# Patient Record
Sex: MALE | Race: WHITE | NOT HISPANIC OR LATINO | URBAN - METROPOLITAN AREA
[De-identification: names, ages, dates, MRNs, and addresses within clinical notes are randomized per-mention and may not be internally consistent; named-entity substitution may affect disease eponyms.]

---

## 2017-10-30 ENCOUNTER — HOSPITAL ENCOUNTER (EMERGENCY)
Facility: CLINIC | Age: 32
Discharge: HOME | End: 2017-10-30
Attending: EMERGENCY MEDICINE | Admitting: EMERGENCY MEDICINE
Payer: COMMERCIAL

## 2017-10-30 ENCOUNTER — APPOINTMENT (OUTPATIENT)
Dept: GENERAL RADIOLOGY | Facility: CLINIC | Age: 32
End: 2017-10-30
Attending: EMERGENCY MEDICINE
Payer: COMMERCIAL

## 2017-10-30 VITALS
RESPIRATION RATE: 13 BRPM | OXYGEN SATURATION: 99 % | TEMPERATURE: 98.1 F | WEIGHT: 222.4 LBS | SYSTOLIC BLOOD PRESSURE: 133 MMHG | DIASTOLIC BLOOD PRESSURE: 85 MMHG

## 2017-10-30 DIAGNOSIS — R10.13 ABDOMINAL PAIN, EPIGASTRIC: ICD-10-CM

## 2017-10-30 LAB
ALBUMIN SERPL-MCNC: 3.8 G/DL (ref 3.4–5)
ALP SERPL-CCNC: 65 U/L (ref 40–150)
ALT SERPL W P-5'-P-CCNC: 25 U/L (ref 0–70)
ANION GAP SERPL CALCULATED.3IONS-SCNC: 5 MMOL/L (ref 3–14)
AST SERPL W P-5'-P-CCNC: 17 U/L (ref 0–45)
BASOPHILS # BLD AUTO: 0 10E9/L (ref 0–0.2)
BASOPHILS NFR BLD AUTO: 0.3 %
BILIRUB SERPL-MCNC: 0.4 MG/DL (ref 0.2–1.3)
BUN SERPL-MCNC: 19 MG/DL (ref 7–30)
CALCIUM SERPL-MCNC: 8.3 MG/DL (ref 8.5–10.1)
CHLORIDE SERPL-SCNC: 107 MMOL/L (ref 94–109)
CO2 SERPL-SCNC: 28 MMOL/L (ref 20–32)
CREAT SERPL-MCNC: 0.98 MG/DL (ref 0.66–1.25)
DIFFERENTIAL METHOD BLD: NORMAL
EOSINOPHIL # BLD AUTO: 0.4 10E9/L (ref 0–0.7)
EOSINOPHIL NFR BLD AUTO: 5.3 %
ERYTHROCYTE [DISTWIDTH] IN BLOOD BY AUTOMATED COUNT: 12.5 % (ref 10–15)
GFR SERPL CREATININE-BSD FRML MDRD: 88 ML/MIN/1.7M2
GLUCOSE SERPL-MCNC: 92 MG/DL (ref 70–99)
HCT VFR BLD AUTO: 42 % (ref 40–53)
HGB BLD-MCNC: 14.3 G/DL (ref 13.3–17.7)
IMM GRANULOCYTES # BLD: 0 10E9/L (ref 0–0.4)
IMM GRANULOCYTES NFR BLD: 0.3 %
INTERPRETATION ECG - MUSE: NORMAL
LIPASE SERPL-CCNC: 174 U/L (ref 73–393)
LYMPHOCYTES # BLD AUTO: 2.1 10E9/L (ref 0.8–5.3)
LYMPHOCYTES NFR BLD AUTO: 31 %
MCH RBC QN AUTO: 31 PG (ref 26.5–33)
MCHC RBC AUTO-ENTMCNC: 34 G/DL (ref 31.5–36.5)
MCV RBC AUTO: 91 FL (ref 78–100)
MONOCYTES # BLD AUTO: 0.5 10E9/L (ref 0–1.3)
MONOCYTES NFR BLD AUTO: 6.7 %
NEUTROPHILS # BLD AUTO: 3.9 10E9/L (ref 1.6–8.3)
NEUTROPHILS NFR BLD AUTO: 56.4 %
NRBC # BLD AUTO: 0 10*3/UL
NRBC BLD AUTO-RTO: 0 /100
PLATELET # BLD AUTO: 209 10E9/L (ref 150–450)
POTASSIUM SERPL-SCNC: 3.9 MMOL/L (ref 3.4–5.3)
PROT SERPL-MCNC: 6.7 G/DL (ref 6.8–8.8)
RBC # BLD AUTO: 4.61 10E12/L (ref 4.4–5.9)
SODIUM SERPL-SCNC: 140 MMOL/L (ref 133–144)
TROPONIN I SERPL-MCNC: <0.015 UG/L (ref 0–0.04)
WBC # BLD AUTO: 6.8 10E9/L (ref 4–11)

## 2017-10-30 PROCEDURE — 85025 COMPLETE CBC W/AUTO DIFF WBC: CPT | Performed by: EMERGENCY MEDICINE

## 2017-10-30 PROCEDURE — 84484 ASSAY OF TROPONIN QUANT: CPT | Performed by: EMERGENCY MEDICINE

## 2017-10-30 PROCEDURE — 25000132 ZZH RX MED GY IP 250 OP 250 PS 637: Performed by: EMERGENCY MEDICINE

## 2017-10-30 PROCEDURE — 80053 COMPREHEN METABOLIC PANEL: CPT | Performed by: EMERGENCY MEDICINE

## 2017-10-30 PROCEDURE — 83690 ASSAY OF LIPASE: CPT | Performed by: EMERGENCY MEDICINE

## 2017-10-30 PROCEDURE — 71020 XR CHEST 2 VW: CPT

## 2017-10-30 PROCEDURE — 25000125 ZZHC RX 250: Performed by: EMERGENCY MEDICINE

## 2017-10-30 PROCEDURE — 93005 ELECTROCARDIOGRAM TRACING: CPT

## 2017-10-30 PROCEDURE — 99285 EMERGENCY DEPT VISIT HI MDM: CPT | Mod: 25

## 2017-10-30 RX ORDER — DIPHENHYDRAMINE HYDROCHLORIDE AND LIDOCAINE HYDROCHLORIDE AND ALUMINUM HYDROXIDE AND MAGNESIUM HYDRO
5-10 KIT EVERY 6 HOURS PRN
Qty: 237 ML | Refills: 1 | Status: SHIPPED | OUTPATIENT
Start: 2017-10-30

## 2017-10-30 RX ADMIN — LIDOCAINE HYDROCHLORIDE 30 ML: 20 SOLUTION ORAL; TOPICAL at 19:00

## 2017-10-30 ASSESSMENT — ENCOUNTER SYMPTOMS
SHORTNESS OF BREATH: 0
COUGH: 0
DIARRHEA: 0
CONSTIPATION: 0
ANAL BLEEDING: 0
BLOOD IN STOOL: 0
DIAPHORESIS: 0
FEVER: 0
NAUSEA: 0
VOMITING: 0

## 2017-10-30 NOTE — ED AVS SNAPSHOT
Emergency Department    [address] [facility] [address]    Phone:  [phone]    Fax:  [phone]                                       Cesario Boykin   MRN: [id number]    Department:   Emergency Department   Date of Visit:  10/30/2017           Patient Information     Date Of Birth          1985        Your diagnoses for this visit were:     Abdominal pain, epigastric        You were seen by Tomás Hernandez MD.      Follow-up Information     Follow up with  Emergency Department.    Specialty:  EMERGENCY MEDICINE    Why:  As needed    Contact information:    [address] [facility] [address] [phone]        Follow up with Demar Marsh MD In 1 day.    Specialty:  Family Practice    Contact information:    [facility]  [address]  [facility] [address] [phone]          Discharge Instructions       Take the below medications as prescribed.  Please do not miss any doses.    New Prescriptions    MAGIC MOUTHWASH SUSPENSION (DIPHENHYDRAMINE, LIDOCAINE, ALUMINUM-MAGNESIUM & SIMETHICONE)    Swish and swallow 5-10 mLs in mouth every 6 hours as needed for mouth sores    OMEPRAZOLE (PRILOSEC) 20 MG CR CAPSULE    Take 2 capsules (40 mg) by mouth 2 times daily for 14 days     1. Avoid NSAIDs.  2. Discuss follow-up with Gastroenterology with your primary doctor tomorrow.  3. Please return to the ED as needed for new or worsening symptoms such as vomiting blood, bloody or black bowel movements, fainting, new or worsening chest pain, any other concerning symptoms.            Discharge References/Attachments     GASTRITIS (ADULT) (ENGLISH)      24 Hour Appointment Hotline       To make an appointment at any [facility] clinic, call [phone] ([phone]). If you don't have a family doctor or clinic, we will help you find one. [facility] clinics are conveniently located to serve the needs of you and your family.             Review of your  medicines      START taking        Dose / Directions Last dose taken    FIRST-MOUTHWASH BLM MT Susp compounding kit   Dose:  5-10 mL   Quantity:  237 mL        Swish and swallow 5-10 mLs in mouth every 6 hours as needed for mouth sores   Refills:  1        omeprazole 20 MG CR capsule   Commonly known as:  priLOSEC   Dose:  40 mg   Quantity:  56 capsule        Take 2 capsules (40 mg) by mouth 2 times daily for 14 days   Refills:  0                Prescriptions were sent or printed at these locations (2 Prescriptions)                   Other Prescriptions                Printed at Department/Unit printer (2 of 2)         omeprazole (PRILOSEC) 20 MG CR capsule               magic mouthwash suspension (diphenhydramine, lidocaine, aluminum-magnesium & simethicone)                Procedures and tests performed during your visit     CBC with platelets differential    Comprehensive metabolic panel    EKG 12-lead, tracing only    Lipase    Troponin I    XR Chest 2 Views      Orders Needing Specimen Collection     None      Pending Results     No orders found from 10/28/2017 to 10/31/2017.            Pending Culture Results     No orders found from 10/28/2017 to 10/31/2017.            Pending Results Instructions     If you had any lab results that were not finalized at the time of your Discharge, you can call the ED Lab Result RN at [phone]. You will be contacted by this team for any positive Lab results or changes in treatment. The nurses are available 7 days a week from 10A to 6:30P.  You can leave a message 24 hours per day and they will return your call.        Test Results From Your Hospital Stay        10/30/2017  7:24 PM      Component Results     Component Value Ref Range & Units Status    WBC 6.8 4.0 - 11.0 10e9/L Final    RBC Count 4.61 4.4 - 5.9 10e12/L Final    Hemoglobin 14.3 13.3 - 17.7 g/dL Final    Hematocrit 42.0 40.0 - 53.0 % Final    MCV 91 78 - 100 fl Final    MCH 31.0 26.5 - 33.0 pg Final    MCHC  34.0 31.5 - 36.5 g/dL Final    RDW 12.5 10.0 - 15.0 % Final    Platelet Count 209 150 - 450 10e9/L Final    Diff Method Automated Method  Final    % Neutrophils 56.4 % Final    % Lymphocytes 31.0 % Final    % Monocytes 6.7 % Final    % Eosinophils 5.3 % Final    % Basophils 0.3 % Final    % Immature Granulocytes 0.3 % Final    Nucleated RBCs 0 0 /100 Final    Absolute Neutrophil 3.9 1.6 - 8.3 10e9/L Final    Absolute Lymphocytes 2.1 0.8 - 5.3 10e9/L Final    Absolute Monocytes 0.5 0.0 - 1.3 10e9/L Final    Absolute Eosinophils 0.4 0.0 - 0.7 10e9/L Final    Absolute Basophils 0.0 0.0 - 0.2 10e9/L Final    Abs Immature Granulocytes 0.0 0 - 0.4 10e9/L Final    Absolute Nucleated RBC 0.0  Final         10/30/2017  7:45 PM      Component Results     Component Value Ref Range & Units Status    Sodium 140 133 - 144 mmol/L Final    Potassium 3.9 3.4 - 5.3 mmol/L Final    Chloride 107 94 - 109 mmol/L Final    Carbon Dioxide 28 20 - 32 mmol/L Final    Anion Gap 5 3 - 14 mmol/L Final    Glucose 92 70 - 99 mg/dL Final    Urea Nitrogen 19 7 - 30 mg/dL Final    Creatinine 0.98 0.66 - 1.25 mg/dL Final    GFR Estimate 88 >60 mL/min/1.7m2 Final    Non  GFR Calc    GFR Estimate If Black >90 >60 mL/min/1.7m2 Final    African American GFR Calc    Calcium 8.3 (L) 8.5 - 10.1 mg/dL Final    Bilirubin Total 0.4 0.2 - 1.3 mg/dL Final    Albumin 3.8 3.4 - 5.0 g/dL Final    Protein Total 6.7 (L) 6.8 - 8.8 g/dL Final    Alkaline Phosphatase 65 40 - 150 U/L Final    ALT 25 0 - 70 U/L Final    AST 17 0 - 45 U/L Final         10/30/2017  7:41 PM      Component Results     Component Value Ref Range & Units Status    Lipase 174 73 - 393 U/L Final         10/30/2017  7:45 PM      Component Results     Component Value Ref Range & Units Status    Troponin I ES <0.015 0.000 - 0.045 ug/L Final    The 99th percentile for upper reference range is 0.045 ug/L.  Troponin values   in the range of 0.045 - 0.120 ug/L may be associated with risks  of adverse   clinical events.           10/30/2017  7:49 PM      Narrative     XR CHEST 2 VW 10/30/2017 7:21 PM    COMPARISON: None.    HISTORY: Chest pain.        Impression     IMPRESSION: Cardiac silhouette and pulmonary vasculature are within  normal limits. No focal airspace disease, pleural effusion or  pneumothorax.    SUZANNA SAMUELS MD                Clinical Quality Measure: Blood Pressure Screening     Your blood pressure was checked while you were in the emergency department today. The last reading we obtained was  BP: 128/85 . Please read the guidelines below about what these numbers mean and what you should do about them.  If your systolic blood pressure (the top number) is less than 120 and your diastolic blood pressure (the bottom number) is less than 80, then your blood pressure is normal. There is nothing more that you need to do about it.  If your systolic blood pressure (the top number) is 120-139 or your diastolic blood pressure (the bottom number) is 80-89, your blood pressure may be higher than it should be. You should have your blood pressure rechecked within a year by a primary care provider.  If your systolic blood pressure (the top number) is 140 or greater or your diastolic blood pressure (the bottom number) is 90 or greater, you may have high blood pressure. High blood pressure is treatable, but if left untreated over time it can put you at risk for heart attack, stroke, or kidney failure. You should have your blood pressure rechecked by a primary care provider within the next 4 weeks.  If your provider in the emergency department today gave you specific instructions to follow-up with your doctor or provider even sooner than that, you should follow that instruction and not wait for up to 4 weeks for your follow-up visit.        Thank you for choosing [facility]       Thank you for choosing [facility] for your care. Our goal is always to provide you with excellent care. Hearing back from our  "patients is one way we can continue to improve our services. Please take a few minutes to complete the written survey that you may receive in the mail after you visit with us. Thank you!        [employer] Information     [employer] lets you send messages to your doctor, view your test results, renew your prescriptions, schedule appointments and more. To sign up, go to [email or web address] . Click on \"Log in\" on the left side of the screen, which will take you to the Welcome page. Then click on \"Sign up Now\" on the right side of the page.     You will be asked to enter the access code listed below, as well as some personal information. Please follow the directions to create your username and password.     Your access code is: [email or web address]  Expires: 2018  9:00 PM     Your access code will  in 90 days. If you need help or a new code, please call your [facility] clinic or [phone].        Care EveryWhere ID     This is your Care EveryWhere ID. This could be used by other organizations to access your [facility] medical records  [id number]        Equal Access to Services     GENA LIMA : Hadmolly Zepeda, carlton vigil, katelynn perera, eric maria . So [facility] [phone].    ATENCIÓN: Si habla español, tiene a shell disposición servicios gratuitos de asistencia lingüística. Lisette al [phone].    We comply with applicable federal civil rights laws and [place] laws. We do not discriminate on the basis of race, color, national origin, age, disability, sex, sexual orientation, or gender identity.            After Visit Summary       This is your record. Keep this with you and show to your community pharmacist(s) and doctor(s) at your next visit.                  "

## 2017-10-30 NOTE — ED AVS SNAPSHOT
Emergency Department    [address] [place] [address]    Phone:  [phone]    Fax:  [phone]                                       Cesario Boykin   MRN: [id number]    Department:   Emergency Department   Date of Visit:  10/30/2017           After Visit Summary Signature Page     I have received my discharge instructions, and my questions have been answered. I have discussed any challenges I see with this plan with the nurse or doctor.    ..........................................................................................................................................  Patient/Patient Representative Signature      ..........................................................................................................................................  Patient Representative Print Name and Relationship to Patient    ..................................................               ................................................  Date                                            Time    ..........................................................................................................................................  Reviewed by Signature/Title    ...................................................              ..............................................  Date                                                            Time

## 2017-10-30 NOTE — ED PROVIDER NOTES
History     Chief Complaint:  Chest Pain     HPI   Cesario Boykin is a 32 year old male with history of GERD who presents to the emergency department today for evaluation of chest pain. The patient reports left sided chest pain since around 1900 last night, that started while at rest. This chest pain has been present waxing and waning since onset, and is more of a pressure than pain rating his discomfort a 5/10. The patient reports history of GERD and a stomach ulcer and history of associated chest pain. However, he states he has never had chest pain last as long as it has currently. The pain is not worsened with eating or movement, and did not seem to be alleviated by an antacid. However, he does report using a different machine at the gym yesterday than usual, but he denied any discomfort while at the gym. He denies any accompanying shortness of breath, nausea, diaphoresis. He denies any changes in bowel movents, black/bloody stools, vomiting, urinary symptoms, cough, fever, rashes, or any other symptoms.     Allergies:  No Known Drug Allergies      Medications:    Mobic 15mg PO  Lansoprazole 30mg PO     Past Medical History:    GERD   Diverticulosis of colon  Migraine     Past Surgical History:    History reviewed. No pertinent past surgical history.     Family History:    History reviewed. No pertinent family history.      Social History:  The patient was accompanied to the ED by himself.  Smoking Status: Former smoker  Smokeless Tobacco: No  Alcohol Use: Yes   Marital Status:  Single     Review of Systems   Constitutional: Negative for diaphoresis and fever.   Respiratory: Negative for cough and shortness of breath.    Cardiovascular: Positive for chest pain.   Gastrointestinal: Negative for anal bleeding, blood in stool, constipation, diarrhea, nausea and vomiting.   Skin: Negative for rash.   All other systems reviewed and are negative.    Physical Exam     Patient Vitals for the past 24 hrs:   BP Temp  Temp src Heart Rate Resp SpO2 Weight   10/30/17 2106 133/85 - - 56 - 99 % -   10/30/17 2030 128/85 - - 57 13 97 % -   10/30/17 2000 137/85 - - 55 15 96 % -   10/30/17 1941 134/81 - - - - 97 % -   10/30/17 1816 152/80 98.1  F (36.7  C) Temporal 58 18 100 % 100.9 kg (222 lb 6.4 oz)      Physical Exam  Constitutional: Well developed, nontox appearance  Head: Atraumatic.   Mouth/Throat: Oropharynx is clear and moist.   Neck:  no stridor  Eyes: no scleral icterus  Cardiovascular: RRR, 2+ bilat radial pulses, bilat DP pulses  Pulmonary/Chest: nml resp effort, Clear BS bilat, no chest tenderness  Abdominal: ND, +BS, soft, epigastric tenderness, no rebound or guarding   : no CVA tenderness bilat  Ext: Warm, well perfused, no edema  Neurological: A&O, symmetric facies, moves ext x4  Skin: Skin is warm and dry.   Psychiatric: Behavior is normal. Thought content normal.   Nursing note and vitals reviewed.      Emergency Department Course     ECG:  Indication: Chest pain   Completed at 1823.  Read at 1830.   Normal sinus rhythm with sinus arrhythmia. Possible left atrial enlargement. Borderline ECG.   Rate 68 bpm. OR interval 128. QRS duration 86. QT/QTc 414/440. P-R-T axes 76 50 49.     Imaging:  Radiology findings were communicated with the patient who voiced understanding of the findings.    XR Chest 2 Views:  IMPRESSION: Cardiac silhouette and pulmonary vasculature are within  normal limits. No focal airspace disease, pleural effusion or  pneumothorax.  SUZANNA SAMUELS MD    Laboratory:  Laboratory findings were communicated with the patient who voiced understanding of the findings.    CBC: WBC 6.8, HGB 14.3,   CMP: Calcium 8.3 (L), Protein total 6.7 (L), o/w WNL. (Creatinine 0.98)   Lipase: 174   Troponin (Collected 1900): <0.015     Interventions:  1900 GI Cocktail 30mg PO      Emergency Department Course:  Nursing notes and vitals reviewed.  1829 I performed an exam of the patient as documented above.   EKG  obtained in the ED, see results above.    IV was inserted and blood was drawn for laboratory testing, results above.   The patient was sent for a chest x-ray while in the emergency department, results above.    2048 Patient rechecked and updated on laboratory and imaging results.  I discussed the treatment plan with the patient. They expressed understanding of this plan and consented to discharge. They will be discharged home with instructions for care and follow up. In addition, the patient will return to the emergency department if their symptoms worsen, if new symptoms arise or if there is any concern.  All questions were answered. I personally reviewed the laboratory and imaging results with the Patient and answered all related questions prior to discharge.   Impression & Plan      Medical Decision Making:  Cesario Boykin is a 32 year old male presenting with left lower chest discomfort, non pleuritic.    Differential diagnosis includes GERD, gastritis, ulcer, pneumothorax, pneumonia. Doubt PE given patient is PERC negative. Given epigastric tenderness on exam it would seem most likely the patient is having another episode of gastritis given the symptoms are similar to previous episodes of gastritis/ ulcer although prolonged. Patient given a GI cocktail with mild improvement in symptoms. Labs unremarkable including negative troponin. EKG also unremarkable for acute ischemic findings. Patient likely has a recurrence of his gastritis or ulcer. I changed his prevacid to high dose omeprazole BID with recommendations to follow up with his primary care doctor as scheduled tomorrow with discussion of possible GI referral for EGD. The patient is given the prescriptions as noted below. Counseled on all results, diagnosis, and disposition prior to discharge. He is in understanding and agreement of plan. Patient discharged in stable condition.     Diagnosis:    ICD-10-CM    1. Abdominal pain, epigastric R10.13       Disposition:  Discharged to home with the below prescription.     Discharge Medications:   Details   omeprazole (PRILOSEC) 20 MG CR capsule Take 2 capsules (40 mg) by mouth 2 times daily for 14 days, Disp-56 capsule, R-0, Local Print      magic mouthwash suspension (diphenhydramine, lidocaine, aluminum-magnesium & simethicone) Swish and swallow 5-10 mLs in mouth every 6 hours as needed for mouth sores, Disp-237 mL, R-1, Local Print             Scribe Disclosure:  IJose De Jesus, am serving as a scribe at 6:26 PM on 10/30/2017 to document services personally performed by Tomás Hernandez MD based on my observations and the provider's statements to me.    10/30/2017    EMERGENCY DEPARTMENT       Tomás Hernandez MD  10/31/17 4802

## 2017-10-31 NOTE — DISCHARGE INSTRUCTIONS
Take the below medications as prescribed.  Please do not miss any doses.    New Prescriptions    MAGIC MOUTHWASH SUSPENSION (DIPHENHYDRAMINE, LIDOCAINE, ALUMINUM-MAGNESIUM & SIMETHICONE)    Swish and swallow 5-10 mLs in mouth every 6 hours as needed for mouth sores    OMEPRAZOLE (PRILOSEC) 20 MG CR CAPSULE    Take 2 capsules (40 mg) by mouth 2 times daily for 14 days     1. Avoid NSAIDs.  2. Discuss follow-up with Gastroenterology with your primary doctor tomorrow.  3. Please return to the ED as needed for new or worsening symptoms such as vomiting blood, bloody or black bowel movements, fainting, new or worsening chest pain, any other concerning symptoms.

## 2017-10-31 NOTE — ED NOTES
MD at bedside to reassess pt.  Dayna Pablo RN,.......................................... 10/30/2017   8:50 PM

## 2019-06-19 ENCOUNTER — OFFICE VISIT - HEALTHEAST (OUTPATIENT)
Dept: RHEUMATOLOGY | Facility: CLINIC | Age: 34
End: 2019-06-19

## 2019-06-19 DIAGNOSIS — R20.2 NUMBNESS AND TINGLING OF HAND: ICD-10-CM

## 2019-06-19 DIAGNOSIS — M54.2 CERVICALGIA: ICD-10-CM

## 2019-06-19 DIAGNOSIS — R20.0 NUMBNESS AND TINGLING OF HAND: ICD-10-CM

## 2019-06-19 DIAGNOSIS — M25.50 POLYARTHRALGIA: ICD-10-CM

## 2019-06-19 LAB
ALBUMIN SERPL-MCNC: 4.3 G/DL (ref 3.5–5)
ALT SERPL W P-5'-P-CCNC: 20 U/L (ref 0–45)
BASOPHILS # BLD AUTO: 0 THOU/UL (ref 0–0.2)
BASOPHILS NFR BLD AUTO: 1 % (ref 0–2)
CREAT SERPL-MCNC: 1.07 MG/DL (ref 0.7–1.3)
EOSINOPHIL # BLD AUTO: 0.2 THOU/UL (ref 0–0.4)
EOSINOPHIL NFR BLD AUTO: 3 % (ref 0–6)
ERYTHROCYTE [DISTWIDTH] IN BLOOD BY AUTOMATED COUNT: 11.2 % (ref 11–14.5)
GFR SERPL CREATININE-BSD FRML MDRD: >60 ML/MIN/1.73M2
HCT VFR BLD AUTO: 46.7 % (ref 40–54)
HGB BLD-MCNC: 15.7 G/DL (ref 14–18)
LYMPHOCYTES # BLD AUTO: 2.1 THOU/UL (ref 0.8–4.4)
LYMPHOCYTES NFR BLD AUTO: 32 % (ref 20–40)
MCH RBC QN AUTO: 30.7 PG (ref 27–34)
MCHC RBC AUTO-ENTMCNC: 33.5 G/DL (ref 32–36)
MCV RBC AUTO: 92 FL (ref 80–100)
MONOCYTES # BLD AUTO: 0.6 THOU/UL (ref 0–0.9)
MONOCYTES NFR BLD AUTO: 9 % (ref 2–10)
NEUTROPHILS # BLD AUTO: 3.7 THOU/UL (ref 2–7.7)
NEUTROPHILS NFR BLD AUTO: 55 % (ref 50–70)
PLATELET # BLD AUTO: 198 THOU/UL (ref 140–440)
PMV BLD AUTO: 8.3 FL (ref 7–10)
RBC # BLD AUTO: 5.1 MILL/UL (ref 4.4–6.2)
RHEUMATOID FACT SERPL-ACNC: <15 IU/ML (ref 0–30)
TSH SERPL DL<=0.005 MIU/L-ACNC: 0.94 UIU/ML (ref 0.3–5)
URATE SERPL-MCNC: 4.1 MG/DL (ref 3–8)
WBC: 6.7 THOU/UL (ref 4–11)

## 2019-06-19 ASSESSMENT — MIFFLIN-ST. JEOR: SCORE: 1915.55

## 2019-06-20 LAB
ANA SER QL: 0.5 U
CCP AB SER IA-ACNC: <0.5 U/ML
HCV AB SERPL QL IA: NEGATIVE

## 2019-07-04 ENCOUNTER — COMMUNICATION - HEALTHEAST (OUTPATIENT)
Dept: SCHEDULING | Facility: CLINIC | Age: 34
End: 2019-07-04

## 2021-05-29 NOTE — PROGRESS NOTES
ASSESSMENT AND PLAN:  Cesario Boykin 34 y.o. male is seen here on 06/19/19 for evaluation of bilateral hand pain, numbness tingling, with features suggestive of carpal tunnel syndrome bilaterally, further work-up as noted, including EMG, given the bilaterality further work-up as noted.  His neck pain is long standing, and associated conditions such as disc disease could cause impingement and neuropathic symptoms.  Once again the EMG would help point out to close there.  Further evaluation as noted with C-spine x-rays.  We will get together in the next few weeks for the course to be charted accordingly.    Diagnoses and all orders for this visit:    Polyarthralgia  -     HM1(CBC and Differential)  -     Creatinine  -     ALT (SGPT)  -     Albumin  -     CCP Antibodies  -     Rheumatoid Factor Quant  -     Hepatitis C Antibody (Anti-HCV)  -     Uric Acid  -     Antinuclear Antibody (JOSHUA) Cascade  -     HM1 (CBC with Diff)  -     EMG; Future; Expected date: 06/19/2019  -     TSH    Numbness and tingling of hand  -     XR Cervical Spine 2 - 3 VWS; Future; Expected date: 06/19/2019  -     HM1(CBC and Differential)  -     Creatinine  -     ALT (SGPT)  -     Albumin  -     CCP Antibodies  -     Rheumatoid Factor Quant  -     Hepatitis C Antibody (Anti-HCV)  -     Uric Acid  -     Antinuclear Antibody (JOSHUA) Cascade  -     XR Cervical Spine 2 - 3 VWS  -     HM1 (CBC with Diff)  -     EMG; Future; Expected date: 06/19/2019  -     TSH    Cervicalgia  -     XR Cervical Spine 2 - 3 VWS; Future; Expected date: 06/19/2019  -     XR Cervical Spine 2 - 3 VWS      HISTORY OF PRESENTING ILLNESS:  Cesario Boykin, 34 y.o., male is here for evaluation of multiplicity of symptoms.  He reports bilateral hand pain numbness and tingling, this is gone on for the past 2 years, this is intermittent, the does not seem to be a particular pattern although wonders if he uses his hands more the symptoms get worse, he rates these at a  moderately severe.  In addition he has had a painful neck which has troubled him before he went longer 3 to 4 years.  He is not sure if there is a radicular component, he has noted the pain besides the neck in the interscapular area.  He recalls being seen at a clinic elsewhere where an MRI of the C-spine recall.  He has not had low back pain.  2 weeks ago while on vacation he was doing some had standing and lost balance and has injured his right hand/wrist.  He has noted stiffness in the morning for about an hour.  He is noted fatigue had a rash such as psoriasis himself or the family.  There is no family history of rheumatoid arthritis ankylosing spondylitis lupus.  He is a , non-smoker alcohol every now and again.  He would consider himself otherwise in good health.    Further historical information and ADL limitations as noted in the multidimensional health assessment questionnaire attached in the EMR. Rest of the 13 system ROS is negative.     ALLERGIES:Patient has no known allergies.    PAST MEDICAL/ACTIVE PROBLEMS/MEDICATION/ FAMILY HISTORY/SOCIAL DATA:  The patient has a family history of  No past medical history on file.  Social History     Tobacco Use   Smoking Status Former Smoker   Smokeless Tobacco Never Used     Patient Active Problem List   Diagnosis     Esophageal Reflux     Headache     Current Outpatient Medications   Medication Sig Dispense Refill     esomeprazole (NEXIUM 24HR) 20 MG capsule Take 20 mg by mouth once.       meloxicam (MOBIC) 15 MG tablet Take 15 mg by mouth once.       No current facility-administered medications for this visit.        COMPREHENSIVE EXAMINATION:  Vitals:    06/19/19 0859   BP: 118/82   Patient Site: Right Arm   Patient Position: Sitting   Cuff Size: Adult Large   Pulse: 64   Weight: 210 lb (95.3 kg)   Height: 6' (1.829 m)     A well appearing alert oriented male. Vital data as noted above. His eyes without inflammation/scleromalacia. ENTwithout  oral mucositis, thrush, nasal deformity, external ear redness, deformity. His neck is without lymphadenopathy and supple. Lungs normal sounds, no pleural rub. Heart auscultation normal rate, rhythm; no pericardial rub and murmurs. Abdomen soft, non tender, no organomegaly. Skin examined for heliotrope, malar area eruption, lupus pernio, periungual erythema, sclerodactyly, papules, erythema nodosum, purpura, nail pitting, onycholysis, and obvious psoriasis lesion. Neurological examination shows normal alertness, speech, facial symmetry, tone and power in upper and lower extremities. The joint examination is performed for swelling, tenderness, warmth, erythema, and range of motion in the following joints: DIPs, PIPs, MCPs, wrists, first CMC's, elbows, shoulders, hips, knees, ankles, feet; spine for range of motion and paraspinal muscles for tenderness. The salient  findings are: He has briskly positive Tinel's in both sides of the wrist, he has subtle swelling of the flexor tendons on both sides, he has tenderness of the right wrist dorsally he feels secondary to recent.  He is INGE the C-spine are full but precipitate the pain at the extremes.  Flexion is painless.  Stone power upper and lower extremity are normal.  He does not have synovitis in any.  He does not have dactylitis of digits of toes.  His occipital wall is 0.  He was able to place his palms flat on the floor as he flex at the lumbar spine.  He does not have enthesitis such as a Achilles.    LAB / IMAGING DATA:  ALT   Date Value Ref Range Status   08/08/2009 15 <46 IU/L Final     Albumin   Date Value Ref Range Status   08/08/2009 4.2 3.5 - 5.0 g/dL Final     Creatinine   Date Value Ref Range Status   08/08/2009 1.1 0.6 - 1.5 mg/dL Final       WBC   Date Value Ref Range Status   08/09/2009 7.7 4.0 - 11.0 thou/uL Final   08/08/2009 8.6 4.0 - 11.0 thou/uL Final     Hemoglobin   Date Value Ref Range Status   08/08/2009 15.2 14.0 - 18.0 g/dL Final      Platelets   Date Value Ref Range Status   08/08/2009 183 140 - 440 thou/uL Final       No results found for: JOSHUA, RF, SEDRATE

## 2021-05-30 NOTE — TELEPHONE ENCOUNTER
"Pt reports fracturing bone in R scapuloid wrist.  Occurred several weeks ago, however fracture was not diagnosed until yesterday (July 3rd) by Dr Jorge Mccoy at [facility].  Pt reports wrist fracture occurred while  \"doing hand-stands and cartwheels, then fell onto wrist.\"    Has EMG procedure scheduled tomorrow (July 5th) at [facility].  Pt asks if new cast on R wrist impacts EMG procedure which is being done specifically to assess bilateral hand tingling & /numbness.  [facility] appt is 11 am tomorrow (July 5th); therefore pt intends to call the [facility] early AM tomorrow to ask this question.  [facility] contact # given.    Pt will be calling [facility] Friday morning (July 5th) regarding above question ....      "

## 2021-06-01 ENCOUNTER — RECORDS - HEALTHEAST (OUTPATIENT)
Dept: ADMINISTRATIVE | Facility: CLINIC | Age: 36
End: 2021-06-01

## 2021-06-03 VITALS — BODY MASS INDEX: 28.44 KG/M2 | HEIGHT: 72 IN | WEIGHT: 210 LBS

## 2021-06-16 PROBLEM — M25.50 POLYARTHRALGIA: Status: ACTIVE | Noted: 2019-06-19

## 2021-06-16 PROBLEM — R20.0 NUMBNESS AND TINGLING OF HAND: Status: ACTIVE | Noted: 2019-06-19

## 2021-06-16 PROBLEM — M54.2 CERVICALGIA: Status: ACTIVE | Noted: 2019-06-19

## 2021-06-16 PROBLEM — R20.2 NUMBNESS AND TINGLING OF HAND: Status: ACTIVE | Noted: 2019-06-19
